# Patient Record
Sex: MALE | NOT HISPANIC OR LATINO | Employment: FULL TIME | URBAN - METROPOLITAN AREA
[De-identification: names, ages, dates, MRNs, and addresses within clinical notes are randomized per-mention and may not be internally consistent; named-entity substitution may affect disease eponyms.]

---

## 2023-08-08 ENCOUNTER — APPOINTMENT (OUTPATIENT)
Dept: PRIMARY CARE | Facility: CLINIC | Age: 43
End: 2023-08-08
Payer: COMMERCIAL

## 2023-08-10 ENCOUNTER — OFFICE VISIT (OUTPATIENT)
Dept: PRIMARY CARE | Facility: CLINIC | Age: 43
End: 2023-08-10
Payer: COMMERCIAL

## 2023-08-10 VITALS
WEIGHT: 269 LBS | SYSTOLIC BLOOD PRESSURE: 114 MMHG | OXYGEN SATURATION: 96 % | TEMPERATURE: 98 F | RESPIRATION RATE: 16 BRPM | HEART RATE: 84 BPM | BODY MASS INDEX: 40.77 KG/M2 | HEIGHT: 68 IN | DIASTOLIC BLOOD PRESSURE: 76 MMHG

## 2023-08-10 DIAGNOSIS — Z76.89 ENCOUNTER TO ESTABLISH CARE: Primary | ICD-10-CM

## 2023-08-10 DIAGNOSIS — D22.9 MULTIPLE ATYPICAL NEVI: ICD-10-CM

## 2023-08-10 DIAGNOSIS — I83.813 VARICOSE VEINS OF BOTH LOWER EXTREMITIES WITH PAIN: ICD-10-CM

## 2023-08-10 DIAGNOSIS — F41.8 DEPRESSION WITH ANXIETY: ICD-10-CM

## 2023-08-10 DIAGNOSIS — Z12.83 SCREENING FOR SKIN CANCER: ICD-10-CM

## 2023-08-10 DIAGNOSIS — E88.819 INSULIN RESISTANCE: ICD-10-CM

## 2023-08-10 DIAGNOSIS — Z87.898 HISTORY OF PREDIABETES: ICD-10-CM

## 2023-08-10 PROBLEM — I83.92 VARICOSE VEINS OF LEFT LOWER EXTREMITY: Status: ACTIVE | Noted: 2023-08-10

## 2023-08-10 PROCEDURE — 1036F TOBACCO NON-USER: CPT | Performed by: NURSE PRACTITIONER

## 2023-08-10 PROCEDURE — 99204 OFFICE O/P NEW MOD 45 MIN: CPT | Performed by: NURSE PRACTITIONER

## 2023-08-10 ASSESSMENT — ENCOUNTER SYMPTOMS
CHILLS: 0
HEADACHES: 0
ABDOMINAL PAIN: 0
ADENOPATHY: 0
DIAPHORESIS: 0
SHORTNESS OF BREATH: 0
HYPERACTIVE: 0
HALLUCINATIONS: 0
PALPITATIONS: 0
AGITATION: 0
WHEEZING: 0
CHOKING: 0
STRIDOR: 0
FEVER: 0
LIGHT-HEADEDNESS: 0
COUGH: 0
APPETITE CHANGE: 0
NAUSEA: 0
DECREASED CONCENTRATION: 0
BRUISES/BLEEDS EASILY: 0
APNEA: 0
DYSPHORIC MOOD: 1
CONFUSION: 0
POLYPHAGIA: 0
ACTIVITY CHANGE: 0
WEAKNESS: 0
VOMITING: 0
DIZZINESS: 0
POLYDIPSIA: 0
FATIGUE: 0
UNEXPECTED WEIGHT CHANGE: 0
CHEST TIGHTNESS: 0
NERVOUS/ANXIOUS: 1
SLEEP DISTURBANCE: 0

## 2023-08-10 NOTE — PROGRESS NOTES
Subjective   Patient ID: Anil Watson is a 43 y.o. male who presents to establish care.    HPI   Patient in office to establish care. Concerns: of (1) varicose veins   (2) depression with anxiety  (3) atypical nevi  (4) hx of prediabetes and insulin resistance    The patient is seeking referrals to vascular medicine, counseling, dermatology, and endocrinology.    He has a hx of morbid obesity (weighed >400 lbs) and was able to lose weight through diet; he has been gaining back some weight recently and is concerned. He has been having varicose veins, at times painful, to his lower legs for years. He has a hx of atypical nevi to his upper back and lower legs and would like them to be evaluated. The patient has a hx of prediabetes and insulin resistance.    The patient sees personal and work stress as main triggers for his depression and anxiety. He does not smoke , drink, or take drugs. No suicidal or homicidal ideation. No other concerns today.     ISABELLA-7: 5/21; PHQ-9: 7/27    Review of Systems   Constitutional:  Negative for activity change, appetite change, chills, diaphoresis, fatigue, fever and unexpected weight change.   Respiratory:  Negative for apnea, cough, choking, chest tightness, shortness of breath, wheezing and stridor.    Cardiovascular:  Negative for chest pain, palpitations and leg swelling.        Varicose veins   Gastrointestinal:  Negative for abdominal pain, nausea and vomiting.   Endocrine: Negative for cold intolerance, heat intolerance, polydipsia, polyphagia and polyuria.   Musculoskeletal:  Negative for gait problem.   Neurological:  Negative for dizziness, syncope, weakness, light-headedness and headaches.   Hematological:  Negative for adenopathy. Does not bruise/bleed easily.   Psychiatric/Behavioral:  Positive for dysphoric mood. Negative for agitation, behavioral problems, confusion, decreased concentration, hallucinations, self-injury, sleep disturbance and suicidal ideas. The patient  "is nervous/anxious. The patient is not hyperactive.          Objective   /76   Pulse 84   Temp 36.7 °C (98 °F) (Temporal)   Resp 16   Ht 1.715 m (5' 7.5\")   Wt 122 kg (269 lb)   SpO2 96%   BMI 41.51 kg/m²     Physical Exam  Constitutional:       Appearance: Normal appearance.   Cardiovascular:      Rate and Rhythm: Normal rate and regular rhythm.      Pulses: Normal pulses.      Heart sounds: Normal heart sounds.   Pulmonary:      Effort: Pulmonary effort is normal.      Breath sounds: Normal breath sounds.   Musculoskeletal:      Cervical back: Neck supple. No rigidity or tenderness.      Right lower leg: No edema.      Left lower leg: No edema.      Comments: Varicose veins to L/R lower legs   Lymphadenopathy:      Cervical: No cervical adenopathy.   Skin:     Coloration: Skin is not jaundiced or pale.      Comments: Atypical nevi to lower leges and upper back   Neurological:      General: No focal deficit present.      Mental Status: He is alert and oriented to person, place, and time.   Psychiatric:         Mood and Affect: Mood normal.         Behavior: Behavior normal.         Thought Content: Thought content normal.         Judgment: Judgment normal.         Assessment/Plan     Exam findings reviewed with patient. Referrals discussed. Follow up in 1 month for fasting lab work and physical or earlier as needed.     "

## 2023-08-15 ENCOUNTER — TELEPHONE (OUTPATIENT)
Dept: PRIMARY CARE | Facility: CLINIC | Age: 43
End: 2023-08-15
Payer: COMMERCIAL

## 2023-08-15 NOTE — TELEPHONE ENCOUNTER
[facility] lab, requesting patient's last office note and insurance information. Fax to [phone].